# Patient Record
Sex: FEMALE | Race: BLACK OR AFRICAN AMERICAN | NOT HISPANIC OR LATINO
[De-identification: names, ages, dates, MRNs, and addresses within clinical notes are randomized per-mention and may not be internally consistent; named-entity substitution may affect disease eponyms.]

---

## 2020-10-19 ENCOUNTER — TRANSCRIPTION ENCOUNTER (OUTPATIENT)
Age: 20
End: 2020-10-19

## 2021-05-13 ENCOUNTER — TRANSCRIPTION ENCOUNTER (OUTPATIENT)
Age: 21
End: 2021-05-13

## 2021-06-09 PROBLEM — Z00.00 ENCOUNTER FOR PREVENTIVE HEALTH EXAMINATION: Status: ACTIVE | Noted: 2021-06-09

## 2021-06-10 ENCOUNTER — APPOINTMENT (OUTPATIENT)
Dept: OTOLARYNGOLOGY | Facility: CLINIC | Age: 21
End: 2021-06-10
Payer: COMMERCIAL

## 2021-06-10 VITALS
HEART RATE: 109 BPM | TEMPERATURE: 97.2 F | SYSTOLIC BLOOD PRESSURE: 119 MMHG | HEIGHT: 67 IN | DIASTOLIC BLOOD PRESSURE: 71 MMHG | BODY MASS INDEX: 26.68 KG/M2 | WEIGHT: 170 LBS

## 2021-06-10 DIAGNOSIS — J35.1 HYPERTROPHY OF TONSILS: ICD-10-CM

## 2021-06-10 DIAGNOSIS — Z78.9 OTHER SPECIFIED HEALTH STATUS: ICD-10-CM

## 2021-06-10 DIAGNOSIS — J35.8 OTHER CHRONIC DISEASES OF TONSILS AND ADENOIDS: ICD-10-CM

## 2021-06-10 DIAGNOSIS — J35.01 CHRONIC TONSILLITIS: ICD-10-CM

## 2021-06-10 DIAGNOSIS — Z72.89 OTHER PROBLEMS RELATED TO LIFESTYLE: ICD-10-CM

## 2021-06-10 PROCEDURE — 99204 OFFICE O/P NEW MOD 45 MIN: CPT

## 2021-06-10 PROCEDURE — 99072 ADDL SUPL MATRL&STAF TM PHE: CPT

## 2021-06-10 NOTE — PHYSICAL EXAM
[Midline] : trachea located in midline position [de-identified] : 3-4+ cryptic and lobular tonsils, no stones or exudate, posterior opx clear [Normal] : no rashes

## 2021-06-10 NOTE — HISTORY OF PRESENT ILLNESS
[de-identified] : 21F here for initial evaluation.\par \par Over the past 8 months or so, she has had three episodes of tonsillitis, each one requiring abx (amoxicillin). Sx at the time include severe throat pain and white spots on her tonsil. She also gets frequent tonsil stones she tries to pick out. There is only mild snoring.\par No other complaints.\par \par ROS otherwise unremarkable.

## 2021-06-10 NOTE — ASSESSMENT
[FreeTextEntry1] : 21F here for initial evaluation. Over the past 8 months or so, she has had three episodes of tonsillitis, each one requiring abx (amoxicillin) as given by her urgent care. Sx at the time include severe throat pain and white spots on her tonsil. She also gets frequent tonsil stones she tries to pick out. There is only mild snoring. On exam, tonsils are 3-4+, cryptic and lobular with no stones or exudates. The rest of the head and neck exam is unremarkable.\par She has cryptic tonsillar hypertrophy and has had several infections over the past year. Given both of those reasons, I gave option of tonsillectomy, which was discussed at length. She is here for college and working over the summer so will see if she has time to schedule surgery and will let me know.

## 2021-08-09 ENCOUNTER — APPOINTMENT (OUTPATIENT)
Dept: OTOLARYNGOLOGY | Facility: HOSPITAL | Age: 21
End: 2021-08-09